# Patient Record
(demographics unavailable — no encounter records)

---

## 2025-02-20 NOTE — COUNSELING
[Fall prevention counseling provided] : Fall prevention counseling provided [Adequate lighting] : Adequate lighting [No throw rugs] : No throw rugs [Behavioral health counseling provided] : Behavioral health counseling provided [Potential consequences of obesity discussed] : Potential consequences of obesity discussed [Benefits of weight loss discussed] : Benefits of weight loss discussed [Structured Weight Management Program suggested:] : Structured weight management program suggested [Encouraged to maintain food diary] : Encouraged to maintain food diary [Encouraged to increase physical activity] : Encouraged to increase physical activity [Encouraged to use exercise tracking device] : Encouraged to use exercise tracking device [Target Wt Loss Goal ___] : Weight Loss Goals: Target weight loss goal [unfilled] lbs [Weigh Self Weekly] : weigh self weekly [Decrease Portions] : decrease portions [____ min/wk Activity] : [unfilled] min/wk activity [Keep Food Diary] : keep food diary [None] : None [Good understanding] : Patient has a good understanding of lifestyle changes and steps needed to achieve self management goal [FreeTextEntry4] : 15

## 2025-02-20 NOTE — PAST MEDICAL HISTORY
[Perimenopausal] : perimenopausal [Definite ___ (Date)] : the last menstrual period was [unfilled] [Total Preg ___] : G[unfilled] [Living ___] : Living: [unfilled]

## 2025-02-20 NOTE — REVIEW OF SYSTEMS
[Patient Intake Form Reviewed] : Patient intake form was reviewed [Chest Pain] : chest pain [Negative] : Heme/Lymph [FreeTextEntry5] : occasional L chest pain, thinks related to GERD

## 2025-02-20 NOTE — ASSESSMENT
[FreeTextEntry1] : 51 y/o F originally from [facility] w/ PMH of HLD, pre-DM, obesity, lipoma, chronic back pain, GERD, tinnitus presenting for comprehensive annual physical exam.   HCM -HIV/HC screenin negative -Mammo: 2023 -  BIRADS . Referral sent -Gyn: Pap 3/2023, normal.  -Colon cancer screening: FOBT kit: negative  schedule Colonoscopy 2024: mild diverticulosis> repeat in 10 years Immunizations: UTD per pt report but no records?  Influenza vaccine: today Shingles: check with insurance Labs: CBC, CMP, UA, thyroid, lipid panel, ecg  #Chest pain - chronic, intermittent in nature - has seen cardiologist, Dr. Inman in the past. Per pt report had full work up done, came back normal -  ECG normal - today /80; BMI 33.84 - f/u ECG, lipid panel, A1C -[facility] eval>  # Dyslipidemia: -Off Rosuvastatin and fenofibrate -Lipid panel today  # Pre DM;/ Obesity -TLC d/w pt -HbA1c:6.2--> 6.0% -Refused Metformin - A1c today  # Lipoma Back: -S/P Excision 24  # Chronic back pain: -Spine orthopedic eval appreciated> new referral -PTx 4 with no benefits.  # GERD: -On Omeprazole prn - pt requesting endoscopy. GI referral sent  # Tinnitus: -F/up with ENT  # Hx Intestinal Parasite - resolved -Seen by GI, Dr Perico HOLLIS in [place] in the past -F/up Dr GONSALES.

## 2025-02-20 NOTE — HISTORY OF PRESENT ILLNESS
[FreeTextEntry1] : here for comprehensive physical exam [de-identified] : 51 y/o F originally from [facility] w/ PMH of HLD, pre-DM, obesity, lipoma, chronic back pain, GERD, tinnitus presenting for comprehensive annual physical exam. Of note, pt has endorsed chronic intermittent L sided chest pain for the last year, not associated with exertion. Pt characterizes chest pain as a "slight pressure", with episodes lasting a few seconds/minutes before self-resolving. She denies SOB, sweating, nausea, vomiting with episodes. Per pt she has gone to cardiologist for symptoms, found to have normal workup. EKG in 2024 was normal. Pt thinks symptoms may be secondary to GERD. Otherwise, pt feels well, no complaints at this time previous PCP: Dr Ranjana Galindo. Medical hx significant for HTN not on meds, HLP and Pre dm. She is off Rosuvastatin and Fenofibrate. since Nov 2023.. GERD on omeprazole Seen by GI>  Colonoscopy 08/27/24. Would like GI referral for endoscopy Seen by cardiology, Dr Copeland for chest pain Seen by GI Dr Perico Haines. Seen by ENT, for Tinnitus Seen by Dr Holt for Lipoma back.> s/p excision 7/5/24 Seen by Spine ortho for Chronic back pain. Seen by cardiologist 2021 , 3 children. 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

## 2025-02-20 NOTE — HEALTH RISK ASSESSMENT
[Good] : ~his/her~  mood as  good [No] : In the past 12 months have you used drugs other than those required for medical reasons? No [Never] : Never [Patient reported mammogram was normal] : Patient reported mammogram was normal [Patient reported PAP Smear was abnormal] : Patient reported PAP Smear was abnormal [HIV Test offered] : HIV Test offered [Hepatitis C test offered] : Hepatitis C test offered [None] : None [With Significant Other] : lives with significant other [With Family] : lives with family [Unemployed] : unemployed [] :  [# Of Children ___] : has [unfilled] children [Sexually Active] : sexually active [Feels Safe at Home] : Feels safe at home [Fully functional (bathing, dressing, toileting, transferring, walking, feeding)] : Fully functional (bathing, dressing, toileting, transferring, walking, feeding) [Fully functional (using the telephone, shopping, preparing meals, housekeeping, doing laundry, using] : Fully functional and needs no help or supervision to perform IADLs (using the telephone, shopping, preparing meals, housekeeping, doing laundry, using transportation, managing medications and managing finances) [Smoke Detector] : smoke detector [Safety elements used in home] : safety elements used in home [Seat Belt] :  uses seat belt [With Patient/Caregiver] : , with patient/caregiver [Designated Healthcare Proxy] : Designated healthcare proxy [Name: ___] : Health Care Proxy's Name: [unfilled]  [Relationship: ___] : Relationship: [unfilled] [No falls in past year] : Patient reported no falls in the past year [0] : 2) Feeling down, depressed, or hopeless: Not at all (0) [de-identified] : exercising, walks [de-identified] : well balanced [Change in mental status noted] : No change in mental status noted [Language] : denies difficulty with language [Behavior] : denies difficulty with behavior [Learning/Retaining New Information] : denies difficulty learning/retaining new information [Handling Complex Tasks] : denies difficulty handling complex tasks [Reasoning] : denies difficulty with reasoning [Spatial Ability and Orientation] : denies difficulty with spatial ability and orientation [Reports changes in hearing] : Reports no changes in hearing [Reports changes in vision] : Reports no changes in vision [Reports changes in dental health] : Reports no changes in dental health [TB Exposure] : is not being exposed to tuberculosis [MammogramDate] : 11/22 [MammogramComments] : at [facility] [PapSmearDate] : 2022 [PapSmearComments] : Gyn at [facility]/ + HPV [de-identified] : spouse and children [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

## 2025-02-26 NOTE — ASSESSMENT
[FreeTextEntry1] : 51 y/o F originally from [facility] w/ PMH of HLD, pre-DM, obesity, lipoma, chronic back pain, GERD, tinnitus presenting for evaluation of chest pressure.

## 2025-02-26 NOTE — DISCUSSION/SUMMARY
[EKG obtained to assist in diagnosis and management of assessed problem(s)] : EKG obtained to assist in diagnosis and management of assessed problem(s) [FreeTextEntry1] : Patient presents for: + chest pressure lower extremity claudication dizziness palpitations hyperlipidemia, mixed uncontrolled metabolic syndrome MASLD Pre-diabetes   Orders placed including imaging/meds:  - 2d TTE - CCTA + CAC for chest pressure - ETT for functional testing - carotid duplex - JULIO/PVR - holter monitor 14 days palpitations  I'm concerned with her metabolic chronic co-morbidities precursor to ASCVD and overall elevated risk enhancers for cardiovascular disease ASCVD 10 year risk score: 2.7%, lifetime risk 39%. - recommend restart Statin Crestor 10mg qday and add Zetia 10mg qday. does not want to take at appt despite conversation about cardiac risk factors. needs nutritional counseling and dieticians, declines at present.   - MASLD: consider GLP-1, weight loss, Nutrional changes, MASLD cardiac relationship discussed in full details. shes not interested in weigh tloss medications  I have instructed the patient to follow a 1200 calories meal plan emphasizing low saturated fat sources and protein with low amount of sodium.  Information on avoiding fast food, fried foods, food with high fat content was suggested, plant based low fat, high fiber diet. We reviewed the efforts of weight reduction identifying 3500 calories in pound of body fat and the need to gradually achieve a long term basis for weight reduction discussed the need to reduce calories for what her current patterns are and to hopefully increase physical activity as well. We discussed menu selection as well as food preparation techniques. Educated patient on 150 minutes of moderate intensity exercise weekly with strength training twice weekly. Encouraged patient to monitor physical activity Discussed the importance of a balanced, healthy diet of nourishing foods and consistent meal pattern for long-term success and health maintenance. Encouraged to increase water intake to facilitate adequate hydration and to cut out sugary drinks and alcohol. Pt educated on eating 4-6 small meals per day, that are high in protein for hunger regulation. Pt educated on cutting out high-sugar content foods sabotaging wt loss. Pt verbalized understanding. we discussed obesity implications with general health and cardiovascular process. I discussed in detail issues that can potentially arise with the cardiovascular and general health system. Consideration of bariatric surgery, weight loss medication and nutritional referral offered.       Patient warm and euvolemic. There is no evidence of active ACS, decompensated HF, uncontrolled arrhythmias or significant valvular heart disease at present. EKG is non-ischemic. We went over the EKG in office today, all questions answered. Vitals reviewed. Cardiac testing recent reviewed.   I've asked the patient to keep a blood pressure journal and report back if SBP >130 or DBP >90 on 2-3 separate random occasions. The patient is aware of alarm cardiac type symptoms, will call with questions or concerns and is aware to activate 911 and/or present to the closest emergency department if concerns.   Follow up: with me in 4 months or sooner as requested.     I strongly encouraged the patient to pursue the following preventative cardiology, lifestyle modifications which are necessary for long-term cardiovascular health. The following is recommended: Advised a mediterranean and/or plant predominant, high fiber, limited salt (ideal <2 g/day), low fat diet, stress reduction/psychosomatic management, sleep hygiene/ASHLY testing and healthy weight loss, annual influenza/preventive vaccines, medication + treatment adherence/compliance, high risk behavior mitigation (I.e. tobacco abstinence, alcohol abstinence, no binge drinking, recreational/illicit drug use abstinence), increased exercise activity aiming for 150 minutes per week of moderate physical activity as per AHA/ACC standard for long term cardiovascular risk reduction.

## 2025-02-26 NOTE — HISTORY OF PRESENT ILLNESS
Name band;
[FreeTextEntry1] : 51 y/o F originally from [facility] w/ PMH of HLD, pre-DM, obesity, lipoma, chronic back pain, GERD, tinnitus presenting for evaluation of chest pressure.  Hyperlipidemia: not taking previously prescribed fenofibrate and rosuvastatin,  lipid panel 02/2025: , , HDL 35, LDL-C 129, NON- has chest pressure, substernal, pressure, occurs on occasion but now progressive. has associated strong palpitations as well, worse at night, does not wake up from sleep no prior sleep apnea testing, not interested works and is always on feet, has cramping of thighs and legs. has occasional dizziness and unclear vision had workup in 2021 with a previous cardiologist Dr. Copeland now having progressive symptoms. no orthopnea, pnd, lower extremity edema. no current chest pain during encounter.

## 2025-05-27 NOTE — HISTORY OF PRESENT ILLNESS
[FreeTextEntry1] : f/up [de-identified] : 49 y/o F originally from [facility] w/ PMH of HLD, pre-DM, obesity, lipoma, chronic back pain, GERD, tinnitus  previous PCP: Dr Ranjana Galindo. Medical hx significant for HTN not on meds, HLP and Pre dm. She is on Rosuvastatin and Fenofibrate. GERD on omeprazole Seen by GI> Colonoscopy 08/27/24. Would like GI referral for endoscopy Seen by cardiology, Dr Copeland for chest pain Seen by GI Dr Perico Haines. Seen by ENT, for Tinnitus Seen by Dr Holt for Lipoma back.> s/p excision 7/5/24 Seen by Spine ortho for Chronic back pain. Seen by cardiologist 2021 , 3 children. 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

## 2025-05-27 NOTE — ASSESSMENT
[FreeTextEntry1] : 49 y/o HF with past hx HTN, HLP, PreDM, gastritis, presents today for f/up.   -HIV/HC screenin negative -Mammo: 2023 -Gyn:  -Colon cancer screening: FOBT kit: negative  schedule Colonoscopy 2024: mild diverticulosis> repeat in 10 years  # Dyslipidemia: -On Rosuvastatin and fenofibrate -Lipid profile to be done outpt  # Pre DM;/ Obesity -TLC d/w pt -HbA1c:6.2--> 6.0% -Refused Metformin  # Lipoma Back: -S/P Excision 24  # Chronic back pain: -Seen by Spine orthopedic  -PT referral. -Advise for Pain MGmt.  # Perimenopausal symptoms: -Start VEOZAH samples.  # GERD: -GI eval appreciated -On Omeprazole prn  # Tinnitus: -F/up with ENT  # Hx Intestinal Parasite: -Seen by GI, Dr Perico HOLLIS in [place] in the past -F/up Dr GONSALES.

## 2025-05-27 NOTE — COUNSELING
[Fall prevention counseling provided] : Fall prevention counseling provided [Behavioral health counseling provided] : Behavioral health counseling provided [Potential consequences of obesity discussed] : Potential consequences of obesity discussed [Benefits of weight loss discussed] : Benefits of weight loss discussed [Structured Weight Management Program suggested:] : Structured weight management program suggested [Encouraged to maintain food diary] : Encouraged to maintain food diary [Encouraged to increase physical activity] : Encouraged to increase physical activity [Encouraged to use exercise tracking device] : Encouraged to use exercise tracking device [Target Wt Loss Goal ___] : Weight Loss Goals: Target weight loss goal [unfilled] lbs [Weigh Self Weekly] : weigh self weekly [Decrease Portions] : decrease portions [____ min/wk Activity] : [unfilled] min/wk activity [Keep Food Diary] : keep food diary [None] : None [Good understanding] : Patient has a good understanding of lifestyle changes and steps needed to achieve self management goal [FreeTextEntry4] : 15

## 2025-05-28 NOTE — HISTORY OF PRESENT ILLNESS
[FreeTextEntry1] : 51 y/o F originally from [facility] w/ PMH of HLD, pre-DM, obesity, lipoma, chronic back pain, GERD, tinnitus presenting for evaluation of chest pressure.  Hyperlipidemia: not taking previously prescribed fenofibrate and rosuvastatin,  lipid panel 02/2025: , , HDL 35, LDL-C 129, NON- has chest pressure, substernal, pressure, occurs on occasion but now progressive. has associated strong palpitations as well, worse at night, does not wake up from sleep no prior sleep apnea testing, not interested works and is always on feet, has cramping of thighs and legs. has occasional dizziness and unclear vision had workup in 2021 with a previous cardiologist Dr. Copeland now having progressive symptoms. no orthopnea, pnd, lower extremity edema. no current chest pain during encounter.  05/2025:  Returns for follow up CCTA: CAC 0, no epicardial CAD Carotid duplex: no evidence of carotid disease Exercise stress test: 83% APHR, 7 mets, + 5 Duke score low risk, no EKG ischemia 2d TTE: Normal BiV function, LV EF 61%, No significant valvular disease JULIO/PVR: normal study. Denies chest pain/pressure, palpitations, irregular and/or rapid heart beat, SOB, BENNETT, syncope/near syncope, dizziness, orthopnea, PND, cough, edema, f/c, n/v/d, hematuria, or hematochezia. Denies claudication, PAD, venous changes, TIA/CVA history, dizziness, amaroux fugax, vision/sensory changes.

## 2025-05-28 NOTE — DISCUSSION/SUMMARY
[FreeTextEntry1] : Patient presents for: + chest pressure lower extremity claudication dizziness palpitations hyperlipidemia, mixed uncontrolled metabolic syndrome MASLD Pre-diabetes  No further chest pressure symptoms.   Cardiac testing reviewed: All normal without evidence of cardiac disease noted.  I'm concerned with her metabolic chronic co-morbidities precursor to ASCVD and overall elevated risk enhancers for cardiovascular disease ASCVD 10 year risk score: 2.7%, lifetime risk 39%. - recommend restart Statin Crestor 10mg qday and add Zetia 10mg qday. does not want to take at appt despite conversation about cardiac risk factors. needs nutritional counseling and dieticians, declines at present.   - MASLD: consider GLP-1, weight loss, Nutrional changes, MASLD cardiac relationship discussed in full details. shes not interested in weigh tloss medications  Discussed obesity counseling with the patient for 15 minutes. I have instructed the patient to follow a 1200 calories meal plan emphasizing low saturated fat sources and protein with low amount of sodium.  Information on avoiding fast food, fried foods, food with high fat content was suggested, plant based low fat, high fiber diet. We reviewed the efforts of weight reduction identifying 3500 calories in pound of body fat and the need to gradually achieve a long term basis for weight reduction discussed the need to reduce calories for what her current patterns are and to hopefully increase physical activity as well. We discussed menu selection as well as food preparation techniques. Educated patient on 150 minutes of moderate intensity exercise weekly with strength training twice weekly. Encouraged patient to monitor physical activity Discussed the importance of a balanced, healthy diet of nourishing foods and consistent meal pattern for long-term success and health maintenance. Encouraged to increase water intake to facilitate adequate hydration and to cut out sugary drinks and alcohol. Pt educated on eating 4-6 small meals per day, that are high in protein for hunger regulation. Pt educated on cutting out high-sugar content foods sabotaging wt loss. Pt verbalized understanding. we discussed obesity implications with general health and cardiovascular process. I discussed in detail issues that can potentially arise with the cardiovascular and general health system. Consideration of bariatric surgery, weight loss medication and nutritional referral offered.   reports reviewed on echo, ccta, braydon/pvr, and carotid and i agree with assessments after independent review. spent 15 minutes reviewing studies.  Patient warm and euvolemic. There is no evidence of active ACS, decompensated HF, uncontrolled arrhythmias or significant valvular heart disease at present. EKG is non-ischemic. We went over the EKG in office today, all questions answered. Vitals reviewed. Cardiac testing recent reviewed.   I've asked the patient to keep a blood pressure journal and report back if SBP >130 or DBP >90 on 2-3 separate random occasions. The patient is aware of alarm cardiac type symptoms, will call with questions or concerns and is aware to activate 911 and/or present to the closest emergency department if concerns.   Follow up: with me in 12 months or sooner as requested.   As with all my patients, I would strongly pursue preventative cardiology, discussed for 10 minutes recommendations, lifestyle modifications which are necessary for long-term cardiovascular health.  The following is recommended: Advised a plant based, limited salt, low fat, minimal dairy diet, stress reduction/psychosomatic management, sleep hygiene/ASHLY testing and healthy weight loss, annual influenza vaccine, medication compliance, high risk behavior mitigation (I.e. tobacco abstinence, alcohol abstinence, recreational/illicit drug use abstinence), increased exercise activity as per AHA/ACC standard for long term cardiovascular risk reduction. [EKG obtained to assist in diagnosis and management of assessed problem(s)] : EKG obtained to assist in diagnosis and management of assessed problem(s)

## 2025-05-28 NOTE — ASSESSMENT
[FreeTextEntry1] : 51 y/o F originally from [facility] w/ PMH of HLD, pre-DM, obesity, lipoma, chronic back pain, GERD, tinnitus presenting for evaluation of chest pressure.

## 2025-07-09 NOTE — HISTORY OF PRESENT ILLNESS
[FreeTextEntry1] : The patient was seen by Dr. Marley in May of last year for fatty liver as well as acid reflux.  He noted that she had undergone a prior endoscopy by another gastroenterologist the name of whom she does not recall.  She apparently underwent the upper endoscopy somewhere in the [facility] and according to the patient she was diagnosed with H. pylori for which she was treated and a subsequent evaluation revealed eradication of the infection, presumably by way of a negative urea breath test.  However she does not recall the actual findings such as whether or not there was any esophagitis or Villanueva's esophagus and she mentions something about a perforated esophagus but never required surgery.  She has been taking omeprazole 20 mg p.o. every morning ever since and has only occasional episodes of heartburn.  However she returns today complaining of frequent postprandial gas and bloating and occasional burning in her throat.  She has also been experiencing some atypical chest pain and underwent an unremarkable cardiac evaluation.  She was requesting a follow-up endoscopy.  Dr. Marley performed a routine screening colonoscopy last year that revealed occasional sigmoid diverticuli and hemorrhoids but was otherwise unremarkable.  He recommended a follow-up colonoscopy in 10 years.  She also has a history of fatty liver and underwent a full set of hepatitis serologies that were negative.  A FibroScan revealed F0/F1 and S2.  An abdominal sonogram revealed fatty liver and hepatomegaly.  Recent liver function tests were normal.

## 2025-07-09 NOTE — ASSESSMENT
[FreeTextEntry1] : Since she continues to have vague dyspeptic symptoms and I am unable to obtain the actual results of the prior upper endoscopy she will be scheduled for follow-up exam.  She will continue to take omeprazole 20 mg p.o. every morning.  The importance of weight reduction in regards to both gastroesophageal reflux as well as fatty liver were discussed with her. The risks, benefits, complications and possible adverse consequences associated with upper endoscopy were discussed with the patient.

## 2025-07-09 NOTE — PHYSICAL EXAM
[Alert] : alert [Normal Voice/Communication] : normal voice/communication [Healthy Appearing] : healthy appearing [No Acute Distress] : no acute distress [Sclera] : the sclera and conjunctiva were normal [Hearing Threshold Finger Rub Not Wilkin] : hearing was normal [Normal Lips/Gums] : the lips and gums were normal [Oropharynx] : the oropharynx was normal [Normal Appearance] : the appearance of the neck was normal [No Respiratory Distress] : no respiratory distress [No Acc Muscle Use] : no accessory muscle use [Respiration, Rhythm And Depth] : normal respiratory rhythm and effort [Heart Rate And Rhythm] : heart rate was normal and rhythm regular [Bowel Sounds] : normal bowel sounds [Abdomen Tenderness] : non-tender [No Masses] : no abdominal mass palpated [Abdomen Soft] : soft [] : no hepatosplenomegaly [Oriented To Time, Place, And Person] : oriented to person, place, and time [Obese (BMI >= 30)] : obese (BMI >= 30)